# Patient Record
Sex: FEMALE | Race: WHITE | NOT HISPANIC OR LATINO | Employment: FULL TIME | URBAN - METROPOLITAN AREA
[De-identification: names, ages, dates, MRNs, and addresses within clinical notes are randomized per-mention and may not be internally consistent; named-entity substitution may affect disease eponyms.]

---
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
## 2021-11-30 LAB — RAPID GROUP A STREP (OHS): NEGATIVE

## 2021-12-28 LAB
INFLUENZA A ANTIGEN, POC: POSITIVE
INFLUENZA B ANTIGEN, POC: NEGATIVE
RAPID GROUP A STREP (OHS): NEGATIVE
SARS-COV-2 RNA RESP QL NAA+PROBE: NEGATIVE

## 2022-04-11 ENCOUNTER — HISTORICAL (OUTPATIENT)
Dept: ADMINISTRATIVE | Facility: HOSPITAL | Age: 22
End: 2022-04-11

## 2022-04-24 VITALS
SYSTOLIC BLOOD PRESSURE: 123 MMHG | HEIGHT: 64 IN | OXYGEN SATURATION: 96 % | BODY MASS INDEX: 32.44 KG/M2 | WEIGHT: 190 LBS | DIASTOLIC BLOOD PRESSURE: 88 MMHG

## 2022-05-12 ENCOUNTER — OFFICE VISIT (OUTPATIENT)
Dept: URGENT CARE | Facility: CLINIC | Age: 22
End: 2022-05-12
Payer: COMMERCIAL

## 2022-05-12 VITALS
BODY MASS INDEX: 39.78 KG/M2 | HEIGHT: 64 IN | OXYGEN SATURATION: 97 % | WEIGHT: 233 LBS | RESPIRATION RATE: 20 BRPM | TEMPERATURE: 98 F | DIASTOLIC BLOOD PRESSURE: 82 MMHG | SYSTOLIC BLOOD PRESSURE: 134 MMHG | HEART RATE: 80 BPM

## 2022-05-12 DIAGNOSIS — J02.9 SORE THROAT: ICD-10-CM

## 2022-05-12 DIAGNOSIS — R05.9 COUGH: Primary | ICD-10-CM

## 2022-05-12 DIAGNOSIS — J40 BRONCHITIS: ICD-10-CM

## 2022-05-12 LAB
CTP QC/QA: YES
FLUAV AG NPH QL: NEGATIVE
FLUBV AG NPH QL: NEGATIVE
S PYO RRNA THROAT QL PROBE: NEGATIVE
SARS-COV-2 RDRP RESP QL NAA+PROBE: NEGATIVE

## 2022-05-12 PROCEDURE — 99213 OFFICE O/P EST LOW 20 MIN: CPT | Mod: 25,,, | Performed by: NURSE PRACTITIONER

## 2022-05-12 PROCEDURE — 3075F SYST BP GE 130 - 139MM HG: CPT | Mod: CPTII,,, | Performed by: NURSE PRACTITIONER

## 2022-05-12 PROCEDURE — 96372 THER/PROPH/DIAG INJ SC/IM: CPT | Mod: ,,, | Performed by: NURSE PRACTITIONER

## 2022-05-12 PROCEDURE — 87804 INFLUENZA ASSAY W/OPTIC: CPT | Mod: QW,,, | Performed by: NURSE PRACTITIONER

## 2022-05-12 PROCEDURE — 3079F DIAST BP 80-89 MM HG: CPT | Mod: CPTII,,, | Performed by: NURSE PRACTITIONER

## 2022-05-12 PROCEDURE — 1160F RVW MEDS BY RX/DR IN RCRD: CPT | Mod: CPTII,,, | Performed by: NURSE PRACTITIONER

## 2022-05-12 PROCEDURE — U0002: ICD-10-PCS | Mod: QW,,, | Performed by: NURSE PRACTITIONER

## 2022-05-12 PROCEDURE — 87804 POCT INFLUENZA A/B: ICD-10-PCS | Mod: QW,,, | Performed by: NURSE PRACTITIONER

## 2022-05-12 PROCEDURE — 99213 PR OFFICE/OUTPT VISIT, EST, LEVL III, 20-29 MIN: ICD-10-PCS | Mod: 25,,, | Performed by: NURSE PRACTITIONER

## 2022-05-12 PROCEDURE — 1160F PR REVIEW ALL MEDS BY PRESCRIBER/CLIN PHARMACIST DOCUMENTED: ICD-10-PCS | Mod: CPTII,,, | Performed by: NURSE PRACTITIONER

## 2022-05-12 PROCEDURE — 87880 STREP A ASSAY W/OPTIC: CPT | Mod: QW,,, | Performed by: NURSE PRACTITIONER

## 2022-05-12 PROCEDURE — U0002 COVID-19 LAB TEST NON-CDC: HCPCS | Mod: QW,,, | Performed by: NURSE PRACTITIONER

## 2022-05-12 PROCEDURE — 1159F PR MEDICATION LIST DOCUMENTED IN MEDICAL RECORD: ICD-10-PCS | Mod: CPTII,,, | Performed by: NURSE PRACTITIONER

## 2022-05-12 PROCEDURE — 3079F PR MOST RECENT DIASTOLIC BLOOD PRESSURE 80-89 MM HG: ICD-10-PCS | Mod: CPTII,,, | Performed by: NURSE PRACTITIONER

## 2022-05-12 PROCEDURE — 3008F PR BODY MASS INDEX (BMI) DOCUMENTED: ICD-10-PCS | Mod: CPTII,,, | Performed by: NURSE PRACTITIONER

## 2022-05-12 PROCEDURE — 87880 POCT RAPID STREP A: ICD-10-PCS | Mod: QW,,, | Performed by: NURSE PRACTITIONER

## 2022-05-12 PROCEDURE — 3008F BODY MASS INDEX DOCD: CPT | Mod: CPTII,,, | Performed by: NURSE PRACTITIONER

## 2022-05-12 PROCEDURE — 3075F PR MOST RECENT SYSTOLIC BLOOD PRESS GE 130-139MM HG: ICD-10-PCS | Mod: CPTII,,, | Performed by: NURSE PRACTITIONER

## 2022-05-12 PROCEDURE — 96372 PR INJECTION,THERAP/PROPH/DIAG2ST, IM OR SUBCUT: ICD-10-PCS | Mod: ,,, | Performed by: NURSE PRACTITIONER

## 2022-05-12 PROCEDURE — 1159F MED LIST DOCD IN RCRD: CPT | Mod: CPTII,,, | Performed by: NURSE PRACTITIONER

## 2022-05-12 RX ORDER — PROMETHAZINE HYDROCHLORIDE AND DEXTROMETHORPHAN HYDROBROMIDE 6.25; 15 MG/5ML; MG/5ML
SYRUP ORAL
COMMUNITY
Start: 2022-05-05

## 2022-05-12 RX ORDER — AZITHROMYCIN 250 MG/1
TABLET, FILM COATED ORAL
Qty: 6 TABLET | Refills: 0 | Status: SHIPPED | OUTPATIENT
Start: 2022-05-12

## 2022-05-12 RX ORDER — NORGESTIMATE AND ETHINYL ESTRADIOL 0.25-0.035
1 KIT ORAL DAILY
COMMUNITY
Start: 2021-11-19

## 2022-05-12 RX ORDER — SERTRALINE HYDROCHLORIDE 25 MG/1
25 TABLET, FILM COATED ORAL DAILY
COMMUNITY
Start: 2022-02-21

## 2022-05-12 RX ORDER — BETAMETHASONE SODIUM PHOSPHATE AND BETAMETHASONE ACETATE 3; 3 MG/ML; MG/ML
6 INJECTION, SUSPENSION INTRA-ARTICULAR; INTRALESIONAL; INTRAMUSCULAR; SOFT TISSUE
Status: COMPLETED | OUTPATIENT
Start: 2022-05-12 | End: 2022-05-12

## 2022-05-12 RX ORDER — SPIRONOLACTONE 100 MG/1
100 TABLET, FILM COATED ORAL DAILY
COMMUNITY
Start: 2022-04-28

## 2022-05-12 RX ADMIN — BETAMETHASONE SODIUM PHOSPHATE AND BETAMETHASONE ACETATE 6 MG: 3; 3 INJECTION, SUSPENSION INTRA-ARTICULAR; INTRALESIONAL; INTRAMUSCULAR; SOFT TISSUE at 03:05

## 2022-05-12 NOTE — PROGRESS NOTES
"Subjective:       Patient ID: Nohemy Cifuentes is a 21 y.o. female.    Vitals:  height is 5' 4" (1.626 m) and weight is 105.7 kg (233 lb). Her oral temperature is 98.2 °F (36.8 °C). Her blood pressure is 134/82 and her pulse is 80. Her respiration is 20 and oxygen saturation is 97%.     Chief Complaint: Sinus Problem (Started last Thursday )    21-year-old female presents with sinus congestion, cough, yellowish productive sputum.  Was seen 1 week ago in ER similar symptoms given a prescription cough medication not sure of the name, mild shortness of breath worse with exertion.  No fever    Sinus Problem  The current episode started in the past 7 days. The problem has been gradually worsening since onset. There has been no fever. Associated symptoms include congestion, coughing, ear pain, headaches, a hoarse voice, shortness of breath, sinus pressure, sneezing, a sore throat and swollen glands.       HENT: Positive for ear pain, congestion, sinus pressure and sore throat.    Respiratory: Positive for cough and shortness of breath.    Allergic/Immunologic: Positive for sneezing.   Neurological: Positive for headaches.       Objective:      Physical Exam   Constitutional: She is oriented to person, place, and time. She appears well-developed. She is cooperative.  Non-toxic appearance. She does not appear ill. No distress.   HENT:   Head: Normocephalic and atraumatic.   Ears:   Right Ear: Hearing, tympanic membrane, external ear and ear canal normal.   Left Ear: Hearing, tympanic membrane, external ear and ear canal normal.   Nose: Nose normal. No mucosal edema, rhinorrhea or nasal deformity. No epistaxis. Right sinus exhibits no maxillary sinus tenderness and no frontal sinus tenderness. Left sinus exhibits no maxillary sinus tenderness and no frontal sinus tenderness.   Mouth/Throat: Uvula is midline, oropharynx is clear and moist and mucous membranes are normal. No trismus in the jaw. Normal dentition. No uvula " swelling. No oropharyngeal exudate, posterior oropharyngeal edema or posterior oropharyngeal erythema.   Eyes: Conjunctivae and lids are normal. No scleral icterus.   Neck: Trachea normal and phonation normal. Neck supple. No edema present. No erythema present. No neck rigidity present.   Cardiovascular: Normal rate, regular rhythm, normal heart sounds and normal pulses.   Pulmonary/Chest: Effort normal and breath sounds normal. No respiratory distress. She has no decreased breath sounds. She has no rhonchi.   Abdominal: Normal appearance.   Musculoskeletal: Normal range of motion.         General: No deformity. Normal range of motion.   Neurological: She is alert and oriented to person, place, and time. She exhibits normal muscle tone. Coordination normal.   Skin: Skin is warm, dry, intact, not diaphoretic and not pale.   Psychiatric: Her speech is normal and behavior is normal. Judgment and thought content normal.   Nursing note and vitals reviewed.        Assessment:       1. Cough    2. Sore throat    3. Bronchitis          Plan:       -Please take antibiotic if prescribed to completion.  Taking Mucinex OTC for cough as directed.  Increase oral fluids.  Complete antibiotic Z-Mohamud as directed.  Follow-up with the primary care provider if symptoms worsen or persist.  Go to ER for worsening shortness of breath as instructed.    Please follow up with your primary care provider within 2-5 days if your signs and symptoms have not resolved or worsen.     If your condition worsens or fails to improve we recommend that you receive another evaluation at the emergency room immediately or contact your primary medical clinic to discuss your concerns.   You must understand that you have received an Urgent Care treatment only and that you may be released before all of your medical problems are known or treated. You, the patient, will arrange for follow up care as instructed.     Cough  -     POCT COVID-19 Rapid Screening  -      POCT Influenza A/B  -     POCT rapid strep A    Sore throat  -     POCT COVID-19 Rapid Screening  -     POCT Influenza A/B  -     POCT rapid strep A    Bronchitis  -     betamethasone acetate-betamethasone sodium phosphate injection 6 mg  -     azithromycin (Z-LITO) 250 MG tablet; Take 2 tablets by mouth on day 1; Take 1 tablet by mouth on days 2-5  Dispense: 6 tablet; Refill: 0

## 2022-05-12 NOTE — PATIENT INSTRUCTIONS
Take prescription cough medication Mucinex as directed.  Increase oral fluids.  Complete prescription Z-Mohamud as directed.  Follow-up with the primary care provider if symptoms worsen or persist or go to ER for worsening symptoms.

## 2022-07-17 ENCOUNTER — HOSPITAL ENCOUNTER (EMERGENCY)
Facility: HOSPITAL | Age: 22
Discharge: HOME | End: 2022-07-17
Attending: STUDENT IN AN ORGANIZED HEALTH CARE EDUCATION/TRAINING PROGRAM
Payer: COMMERCIAL

## 2022-07-17 VITALS
SYSTOLIC BLOOD PRESSURE: 136 MMHG | HEART RATE: 118 BPM | RESPIRATION RATE: 18 BRPM | BODY MASS INDEX: 41.03 KG/M2 | TEMPERATURE: 101 F | HEIGHT: 64 IN | WEIGHT: 240.31 LBS | DIASTOLIC BLOOD PRESSURE: 80 MMHG

## 2022-07-17 DIAGNOSIS — U07.1 COVID-19: Primary | ICD-10-CM

## 2022-07-17 DIAGNOSIS — R05.9 COUGH: ICD-10-CM

## 2022-07-17 DIAGNOSIS — R50.9 ACUTE FEBRILE ILLNESS: ICD-10-CM

## 2022-07-17 PROCEDURE — 96361 HYDRATE IV INFUSION ADD-ON: CPT

## 2022-07-17 PROCEDURE — 63600175 PHARM REV CODE 636 W HCPCS: Performed by: STUDENT IN AN ORGANIZED HEALTH CARE EDUCATION/TRAINING PROGRAM

## 2022-07-17 PROCEDURE — 99284 EMERGENCY DEPT VISIT MOD MDM: CPT | Mod: 25

## 2022-07-17 PROCEDURE — 96374 THER/PROPH/DIAG INJ IV PUSH: CPT

## 2022-07-17 PROCEDURE — 25000003 PHARM REV CODE 250: Performed by: STUDENT IN AN ORGANIZED HEALTH CARE EDUCATION/TRAINING PROGRAM

## 2022-07-17 RX ORDER — ACETAMINOPHEN 500 MG
1000 TABLET ORAL
Status: DISCONTINUED | OUTPATIENT
Start: 2022-07-17 | End: 2022-07-17

## 2022-07-17 RX ORDER — BENZONATATE 100 MG/1
100 CAPSULE ORAL ONCE
Status: COMPLETED | OUTPATIENT
Start: 2022-07-17 | End: 2022-07-17

## 2022-07-17 RX ORDER — KETOROLAC TROMETHAMINE 30 MG/ML
15 INJECTION, SOLUTION INTRAMUSCULAR; INTRAVENOUS ONCE
Status: COMPLETED | OUTPATIENT
Start: 2022-07-17 | End: 2022-07-17

## 2022-07-17 RX ADMIN — KETOROLAC TROMETHAMINE 15 MG: 30 INJECTION, SOLUTION INTRAMUSCULAR; INTRAVENOUS at 09:07

## 2022-07-17 RX ADMIN — SODIUM CHLORIDE, POTASSIUM CHLORIDE, SODIUM LACTATE AND CALCIUM CHLORIDE 1000 ML: 600; 310; 30; 20 INJECTION, SOLUTION INTRAVENOUS at 08:07

## 2022-07-17 RX ADMIN — BENZONATATE 100 MG: 100 CAPSULE ORAL at 09:07

## 2022-07-17 NOTE — DISCHARGE INSTRUCTIONS
Please follow up with your primary care physician in 2-3 days.      Please continue to isolate at home for the next 5 days. Continue to wash your hands regularly and wear a mask as often as possible.     Please return to the emergency department if you develop any worsening symptoms, fever, chest pain, difficulty breathing, or any other new symptoms or concerns.      Thank you for allowing us to take care of you today.

## 2022-07-17 NOTE — ED PROVIDER NOTES
Encounter Date: 7/17/2022       History     Chief Complaint   Patient presents with    Cough     Covid positive this morning, coughing, chills, and chest discomfort when cough, took tylenol last night     Nohemy Cifuentes is a 21 y.o. female with a past medical history of none who presents to the ED for cough, fever, congestion and body aches x2 days. Patient tested positive for COVID-19 this morning. She reports her cough is nonproductive. She denies chest pain or pleurisy. She denies any difficulty swallowing or speaking, neck pain or stiffness, weakness, numbness or tingling. 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       Chief Complaint: Cough  Location: Generalized  Severity: Moderate  Duration: 2 days  Quality: Nonproductive  Onset: Acute  Alleviated by: Tylenol  Aggravated by: none  Associated Symptoms: fever, body aches  Prior Treatment: Tylenol           Review of patient's allergies indicates:  No Known Allergies  Past Medical History:   Diagnosis Date    Acne     Anxiety     Depression      Past Surgical History:   Procedure Laterality Date    DILATION AND CURETTAGE OF UTERUS       History reviewed. No pertinent family history.  Social History     Tobacco Use    Smoking status: Never Smoker    Smokeless tobacco: Never Used   Substance Use Topics    Alcohol use: Never    Drug use: Never     Review of Systems   Constitutional: Positive for fever. Negative for chills.   HENT: Positive for congestion. Negative for drooling and sore throat.    Eyes: Negative for pain and visual disturbance.   Respiratory: Positive for cough. Negative for chest tightness, shortness of breath and wheezing.    Cardiovascular: Negative for chest pain, palpitations and leg swelling.   Gastrointestinal: Negative for abdominal pain, nausea and vomiting.   Genitourinary: Negative for dysuria and hematuria.   Musculoskeletal: Negative for back pain, neck pain and neck stiffness.   Skin: Negative for pallor and rash.    Neurological: Negative for weakness and numbness.   Hematological: Does not bruise/bleed easily.       Physical Exam     Initial Vitals [07/17/22 0801]   BP Pulse Resp Temp SpO2   136/80 (!) 118 18 (!) 100.9 °F (38.3 °C) --      MAP       --         Physical Exam    Nursing note and vitals reviewed.  Constitutional: She appears well-developed and well-nourished.   HENT:   Head: Normocephalic and atraumatic.   Eyes: EOM are normal. Pupils are equal, round, and reactive to light. Right eye exhibits no discharge. Left eye exhibits no discharge.   Neck: Neck supple.   No neck stiffness   Normal range of motion.  Cardiovascular: Regular rhythm and normal heart sounds.   Pulmonary/Chest: Breath sounds normal. No respiratory distress. She has no wheezes.   Abdominal: Abdomen is soft. Bowel sounds are normal. She exhibits no distension. There is no abdominal tenderness.   Musculoskeletal:         General: No edema. Normal range of motion.      Cervical back: Normal range of motion and neck supple.     Neurological: She is alert and oriented to person, place, and time. She has normal strength. No cranial nerve deficit or sensory deficit. GCS score is 15. GCS eye subscore is 4. GCS verbal subscore is 5. GCS motor subscore is 6.   Skin: Skin is warm and dry. Capillary refill takes less than 2 seconds.         ED Course   Procedures  Labs Reviewed - No data to display       Imaging Results          X-Ray Chest 1 View (Final result)  Result time 07/17/22 09:44:33    Final result by Guy Humphries MD (07/17/22 09:44:33)                 Impression:      No acute chest disease is identified.      Electronically signed by: Guy Humphries  Date:    07/17/2022  Time:    09:44             Narrative:    EXAMINATION:  XR CHEST 1 VIEW    CLINICAL HISTORY:  , Cough, unspecified.    FINDINGS:  No alveolar consolidation, effusion, or pneumothorax is seen.   The thoracic aorta is normal  cardiac silhouette, central pulmonary vessels  and mediastinum are normal in size and are grossly unremarkable.   visualized osseous structures are grossly unremarkable.                              X-Rays:   Independently Interpreted Readings:   Other Readings:  CXR: no consolidation or ptx    Medications   ketorolac injection 15 mg (15 mg Intravenous Given 7/17/22 0900)   lactated ringers bolus 1,000 mL (0 mLs Intravenous Stopped 7/17/22 0955)   benzonatate capsule 100 mg (100 mg Oral Given 7/17/22 0900)     Medical Decision Making:   Initial Assessment:   21F presents with cough, fever and body aches.  Differential Diagnosis:   COVID-19, Viral URI, Influenza  Independently Interpreted Test(s):   I have ordered and independently interpreted X-rays - see prior notes.  Clinical Tests:   Lab Tests: Reviewed and Ordered  Radiological Study: Ordered and Reviewed  ED Management:  MDM: Nohemy Cifuentes is a 21 y.o. female with above past medical history who presents to the ED for COVID-19. Vital signs reviewed. CXR pending. Medications ordered for symptom control. Pulse oximetry monitoring ordered. Patient agrees with plan of care. All questions answered.    Update: CXR negative. Symptoms improving in the emergency department. Patient is maintaining normal O2 sats on room air at rest and with ambulation without evidence of respiratory distress. Strict return precautions and COVID-19 precautions and CDC recommendations discussed and patient has verbalized understanding. Patient will follow up with her PCP.    Dispo: Discharge    Data Reviewed/Counseling: I have personally reviewed the patient's vital signs, nursing notes, and other relevant tests, information, and imaging. I had a detailed discussion regarding the historical points, exam findings, and any diagnostic results supporting the discharge diagnosis.     Portions of this note were dictated using voice recognition software. Although it was reviewed for accuracy, some inherent voice recognition errors may  have occurred and be present in this document.               ED Course as of 07/17/22 1343   Sun Jul 17, 2022   0936 Patient is maintaining normal oxygen saturations on room air without evidence of respiratory distress. [MC]      ED Course User Index  [MC] Antoine Romero MD             Clinical Impression:   Final diagnoses:  [R05.9] Cough  [U07.1] COVID-19 (Primary)          ED Disposition Condition    Discharge Stable        ED Prescriptions     None        Follow-up Information     Follow up With Specialties Details Why Contact Info    Your PCP  In 2 days      [facility] - Emergency Dept Emergency Medicine  If symptoms worsen [address] [address]  [phone]           Antoine Romero MD  07/17/22 4413

## 2022-09-21 ENCOUNTER — HISTORICAL (OUTPATIENT)
Dept: ADMINISTRATIVE | Facility: HOSPITAL | Age: 22
End: 2022-09-21
Payer: COMMERCIAL

## 2023-09-18 ENCOUNTER — HOSPITAL ENCOUNTER (EMERGENCY)
Facility: HOSPITAL | Age: 23
Discharge: HOME | End: 2023-09-18
Attending: EMERGENCY MEDICINE
Payer: COMMERCIAL

## 2023-09-18 VITALS
TEMPERATURE: 99 F | HEART RATE: 112 BPM | RESPIRATION RATE: 24 BRPM | WEIGHT: 246.5 LBS | BODY MASS INDEX: 42.08 KG/M2 | OXYGEN SATURATION: 99 % | SYSTOLIC BLOOD PRESSURE: 125 MMHG | DIASTOLIC BLOOD PRESSURE: 93 MMHG | HEIGHT: 64 IN

## 2023-09-18 DIAGNOSIS — R07.9 CHEST PAIN: Primary | ICD-10-CM

## 2023-09-18 DIAGNOSIS — R79.89 ELEVATED TROPONIN: ICD-10-CM

## 2023-09-18 LAB
ALBUMIN SERPL-MCNC: 4.1 G/DL (ref 3.5–5)
ALBUMIN/GLOB SERPL: 1 RATIO (ref 1.1–2)
ALP SERPL-CCNC: 82 UNIT/L (ref 40–150)
ALT SERPL-CCNC: 17 UNIT/L (ref 0–55)
AST SERPL-CCNC: 15 UNIT/L (ref 5–34)
B-HCG UR QL: NEGATIVE
BASOPHILS # BLD AUTO: 0.02 X10(3)/MCL
BASOPHILS NFR BLD AUTO: 0.2 %
BILIRUB SERPL-MCNC: 0.3 MG/DL
BNP BLD-MCNC: 16.5 PG/ML
BUN SERPL-MCNC: 9 MG/DL (ref 7–18.7)
CALCIUM SERPL-MCNC: 10.6 MG/DL (ref 8.4–10.2)
CHLORIDE SERPL-SCNC: 103 MMOL/L (ref 98–107)
CK MB SERPL-MCNC: <1 NG/ML
CK SERPL-CCNC: 109 U/L (ref 29–168)
CO2 SERPL-SCNC: 27 MMOL/L (ref 22–29)
CREAT SERPL-MCNC: 0.8 MG/DL (ref 0.55–1.02)
CTP QC/QA: YES
D DIMER PPP IA.FEU-MCNC: <0.27 UG/ML FEU (ref 0–0.5)
EOSINOPHIL # BLD AUTO: 0.08 X10(3)/MCL (ref 0–0.9)
EOSINOPHIL NFR BLD AUTO: 0.6 %
ERYTHROCYTE [DISTWIDTH] IN BLOOD BY AUTOMATED COUNT: 13 % (ref 11.5–17)
GFR SERPLBLD CREATININE-BSD FMLA CKD-EPI: >60 MLS/MIN/1.73/M2
GLOBULIN SER-MCNC: 4.3 GM/DL (ref 2.4–3.5)
GLUCOSE SERPL-MCNC: 91 MG/DL (ref 74–100)
HCT VFR BLD AUTO: 46.2 % (ref 37–47)
HGB BLD-MCNC: 14.7 G/DL (ref 12–16)
IMM GRANULOCYTES # BLD AUTO: 0.03 X10(3)/MCL (ref 0–0.04)
IMM GRANULOCYTES NFR BLD AUTO: 0.2 %
LYMPHOCYTES # BLD AUTO: 3.67 X10(3)/MCL (ref 0.6–4.6)
LYMPHOCYTES NFR BLD AUTO: 29.1 %
MCH RBC QN AUTO: 25.2 PG (ref 27–31)
MCHC RBC AUTO-ENTMCNC: 31.8 G/DL (ref 33–36)
MCV RBC AUTO: 79.1 FL (ref 80–94)
MONOCYTES # BLD AUTO: 0.69 X10(3)/MCL (ref 0.1–1.3)
MONOCYTES NFR BLD AUTO: 5.5 %
NEUTROPHILS # BLD AUTO: 8.13 X10(3)/MCL (ref 2.1–9.2)
NEUTROPHILS NFR BLD AUTO: 64.4 %
NRBC BLD AUTO-RTO: 0 %
PLATELET # BLD AUTO: 352 X10(3)/MCL (ref 130–400)
PMV BLD AUTO: 9.5 FL (ref 7.4–10.4)
POTASSIUM SERPL-SCNC: 3.5 MMOL/L (ref 3.5–5.1)
PROT SERPL-MCNC: 8.4 GM/DL (ref 6.4–8.3)
RBC # BLD AUTO: 5.84 X10(6)/MCL (ref 4.2–5.4)
SODIUM SERPL-SCNC: 139 MMOL/L (ref 136–145)
TROPONIN I SERPL-MCNC: 0.05 NG/ML (ref 0–0.04)
TROPONIN I SERPL-MCNC: <0.01 NG/ML (ref 0–0.04)
WBC # SPEC AUTO: 12.62 X10(3)/MCL (ref 4.5–11.5)

## 2023-09-18 PROCEDURE — 93005 ELECTROCARDIOGRAM TRACING: CPT

## 2023-09-18 PROCEDURE — 84484 ASSAY OF TROPONIN QUANT: CPT | Performed by: EMERGENCY MEDICINE

## 2023-09-18 PROCEDURE — 85379 FIBRIN DEGRADATION QUANT: CPT | Performed by: EMERGENCY MEDICINE

## 2023-09-18 PROCEDURE — 84484 ASSAY OF TROPONIN QUANT: CPT | Performed by: NURSE PRACTITIONER

## 2023-09-18 PROCEDURE — 81025 URINE PREGNANCY TEST: CPT | Performed by: NURSE PRACTITIONER

## 2023-09-18 PROCEDURE — 85025 COMPLETE CBC W/AUTO DIFF WBC: CPT | Performed by: NURSE PRACTITIONER

## 2023-09-18 PROCEDURE — 82553 CREATINE MB FRACTION: CPT | Performed by: NURSE PRACTITIONER

## 2023-09-18 PROCEDURE — 80053 COMPREHEN METABOLIC PANEL: CPT | Performed by: NURSE PRACTITIONER

## 2023-09-18 PROCEDURE — 99285 EMERGENCY DEPT VISIT HI MDM: CPT | Mod: 25

## 2023-09-18 PROCEDURE — 83880 ASSAY OF NATRIURETIC PEPTIDE: CPT | Performed by: EMERGENCY MEDICINE

## 2023-09-18 PROCEDURE — 82550 ASSAY OF CK (CPK): CPT | Performed by: NURSE PRACTITIONER

## 2023-09-18 NOTE — ED PROVIDER NOTES
Encounter Date: 9/18/2023       History     Chief Complaint   Patient presents with    Pleurisy     Pt c/o chest pressure that worsens with deep breath, she reports this has started last week around Thursday and was feeling this intermittently but has been constant starting today. Pt was seen by her PCP this morning, EKG done, pt's bp meds changed to amlodipne and was given a script for lasix to take for three days. Pt hasn't started those meds, Pt concerned bc pressure not resolving.      She is here with her mother for concerns about blood pressure and chest symptoms.  Underlying history anxiety, depression, hypertension, she has an indwelling birth control implant, recently changed from a diuretic to begin amlodipine, also short-term Lasix for some lower extremity swelling.  Her mother got concerned that her heart rate and blood pressure were both elevated, her heart rate was a little irregular, interfacing flush.  She is been having some chest pressure or tightness for a few days, somewhat worse with deep breath, somewhat associated with a sense of dyspnea and the symptoms seemed more constant today.  She denies any personal or family history of venous thromboembolic disease and has not had any recent injury or travel.  Mild lower extremity edema is symmetric.  No hemoptysis but she does describe some anterior chest discomfort with deep breath.  No other complaints.  No fever, chills or sputum production.    The history is provided by the patient and a parent. No  was used.     Review of patient's allergies indicates:  No Known Allergies  Past Medical History:   Diagnosis Date    Acne     Anxiety     Depression      Past Surgical History:   Procedure Laterality Date    DILATION AND CURETTAGE OF UTERUS       History reviewed. No pertinent family history.  Social History     Tobacco Use    Smoking status: Never    Smokeless tobacco: Never   Substance Use Topics    Alcohol use: Never    Drug  use: Never     Review of Systems   Constitutional:  Negative for activity change, fatigue and fever.   HENT:  Negative for congestion, ear pain, facial swelling, nosebleeds, sinus pressure and sore throat.    Eyes:  Negative for pain, discharge, redness and visual disturbance.   Respiratory:  Positive for chest tightness and shortness of breath. Negative for cough, choking and wheezing.    Cardiovascular:  Positive for leg swelling. Negative for chest pain and palpitations.   Gastrointestinal:  Negative for abdominal distention, abdominal pain, nausea and vomiting.   Endocrine: Negative for heat intolerance, polydipsia and polyuria.   Genitourinary:  Negative for difficulty urinating, dysuria, flank pain, hematuria and urgency.   Musculoskeletal:  Negative for back pain, gait problem, joint swelling and myalgias.   Skin:  Negative for color change and rash.   Allergic/Immunologic: Negative for environmental allergies and food allergies.   Neurological:  Negative for dizziness, weakness, numbness and headaches.   Hematological:  Negative for adenopathy. Does not bruise/bleed easily.   Psychiatric/Behavioral:  Negative for agitation and behavioral problems. The patient is not nervous/anxious.    All other systems reviewed and are negative.      Physical Exam     Initial Vitals [09/18/23 1645]   BP Pulse Resp Temp SpO2   (!) 148/103 104 20 98.5 °F (36.9 °C) 100 %      MAP       --         Physical Exam    Nursing note and vitals reviewed.  Constitutional: She appears well-developed and well-nourished. She is not diaphoretic. No distress.   Mildly hypertensive/ mildly overweight   HENT:   Head: Normocephalic and atraumatic.   Mouth/Throat: No oropharyngeal exudate.   Eyes: Conjunctivae and EOM are normal. Pupils are equal, round, and reactive to light. Right eye exhibits no discharge. Left eye exhibits no discharge. No scleral icterus.   Neck: Neck supple. No thyromegaly present. No tracheal deviation present. No JVD  present.   Normal range of motion.  Cardiovascular:  Regular rhythm, normal heart sounds and intact distal pulses.     Exam reveals no gallop and no friction rub.       No murmur heard.  Mild tachycardia, mild sinus arrhythmia by auscultation   Pulmonary/Chest: Breath sounds normal. No stridor. No respiratory distress. She has no wheezes. She has no rhonchi. She has no rales. She exhibits no tenderness.   Abdominal: Abdomen is soft. Bowel sounds are normal. She exhibits no distension and no mass. There is no abdominal tenderness. There is no rebound and no guarding.   Musculoskeletal:         General: No tenderness or edema. Normal range of motion.      Cervical back: Normal range of motion and neck supple.     Neurological: She is alert and oriented to person, place, and time. She has normal strength.   Skin: Skin is warm and dry. No rash and no abscess noted. No erythema.   Psychiatric: She has a normal mood and affect. Her behavior is normal. Judgment and thought content normal.         ED Course   Procedures  Labs Reviewed   COMPREHENSIVE METABOLIC PANEL - Abnormal; Notable for the following components:       Result Value    Calcium Level Total 10.6 (*)     Protein Total 8.4 (*)     Globulin 4.3 (*)     Albumin/Globulin Ratio 1.0 (*)     All other components within normal limits   TROPONIN I - Abnormal; Notable for the following components:    Troponin-I 0.054 (*)     All other components within normal limits   CBC WITH DIFFERENTIAL - Abnormal; Notable for the following components:    WBC 12.62 (*)     RBC 5.84 (*)     MCV 79.1 (*)     MCH 25.2 (*)     MCHC 31.8 (*)     All other components within normal limits   CK - Normal   CK-MB - Normal   D DIMER, QUANTITATIVE - Normal   B-TYPE NATRIURETIC PEPTIDE - Normal   TROPONIN I - Normal   CBC W/ AUTO DIFFERENTIAL    Narrative:     The following orders were created for panel order CBC auto differential.  Procedure                               Abnormality          Status                     ---------                               -----------         ------                     CBC with Differential[[id number]]       Abnormal            Final result                 Please view results for these tests on the individual orders.   EXTRA TUBES    Narrative:     The following orders were created for panel order EXTRA TUBES.  Procedure                               Abnormality         Status                     ---------                               -----------         ------                     Light Blue Top Hold[[id number]]                             In process                   Please view results for these tests on the individual orders.   LIGHT BLUE TOP HOLD   PREGNANCY TEST, URINE RAPID   POCT URINE PREGNANCY     EKG Readings: (Independently Interpreted)   Initial Reading: No STEMI. Rhythm: Sinus Tachycardia. Heart Rate: 109.   Sinus tachycardia 109 beats per minute, nonspecific T-wave abnormality, no acute diagnostic change in no previous to compare with.     ECG Results              EKG 12-lead (Final result)  Result time 09/18/23 20:42:19      Final result by Interface, Lab In [facility] (09/18/23 20:42:19)                   Narrative:    Test Reason : R07.9,    Vent. Rate : 109 BPM     Atrial Rate : 109 BPM     P-R Int : 136 ms          QRS Dur : 076 ms      QT Int : 336 ms       P-R-T Axes : 031 041 009 degrees     QTc Int : 452 ms    Sinus tachycardia  Nonspecific T wave abnormality  Abnormal ECG  No previous ECGs available  Confirmed by Dany Zambrano MD ([id number]) on 9/18/2023 8:42:08 PM    Referred By: [email or web address]   SELF           Confirmed By:Dany Zambrano MD                                  Imaging Results              X-Ray Chest 1 View (Final result)  Result time 09/18/23 17:31:30      Final result by Andrea Dave MD (09/18/23 17:31:30)                   Impression:      NO ACUTE CARDIOPULMONARY PROCESS IDENTIFIED.      Electronically signed by: Andrea  Dave  Date:    09/18/2023  Time:    17:31               Narrative:    EXAMINATION:  XR CHEST 1 VIEW    CLINICAL HISTORY:  Chest pain, unspecified    TECHNIQUE:  One view    COMPARISON:  July 17, 2022.    FINDINGS:  Cardiopericardial silhouette is within normal limits.  Lungs hypoventilatory changes without dense focal or segmental consolidation, congestive process, pleural effusions or pneumothorax.  Suspect mild scoliosis which may be positional.                                    5:48 PM Very slightly elevated troponin noted.    8:50 PM Sinus rhythm noted to range between upper 90s to low 100s and as high as 130s with stimulation.  Blood pressure gradually dropped from elevated to normal to low normal, elevated again when stimulated.  Repeat troponin undetectable.  Discussed with patient and mother in detail.  Recommend sodium restriction, weight loss, exercise, stress reduction, and routine primary care follow-up.  Is not clear that she has a true underlying diagnosis of hypertension nor that she needs blood pressure medicine.  No sign of emergency condition.  Stable for discharge and home management.       Medications - No data to display  Medical Decision Making  Problems Addressed:  Chest pain: acute illness or injury  Elevated troponin: acute illness or injury    Amount and/or Complexity of Data Reviewed  Labs: ordered. Decision-making details documented in ED Course.  ECG/medicine tests: ordered and independent interpretation performed. Decision-making details documented in ED Course.    Risk  Prescription drug management.      Additional MDM:   Differential Diagnosis:   Anxiety, hypertension, pleurisy, atypical chest pain, pulmonary embolism, other heart disease                             Clinical Impression:   Final diagnoses:  [R07.9] Chest pain (Primary)  [R77.8] Elevated troponin        ED Disposition Condition    Discharge Stable          ED Prescriptions    None       Follow-up Information        Follow up With Specialties Details Why Contact Info    [facility] - Emergency Dept Emergency Medicine  As needed [address] [address] [phone]             Dalton Okeefe MD  09/18/23 2054

## 2023-09-19 NOTE — DISCHARGE INSTRUCTIONS
As discussed, no sign of a significant problem.      Exercise, sodium restriction, weight loss, occasional self-monitoring of blood pressure are recommended and follow-up with primary care.

## 2025-04-13 ENCOUNTER — OFFICE VISIT (OUTPATIENT)
Dept: URGENT CARE | Facility: CLINIC | Age: 25
End: 2025-04-13
Payer: COMMERCIAL

## 2025-04-13 VITALS
TEMPERATURE: 99 F | OXYGEN SATURATION: 99 % | SYSTOLIC BLOOD PRESSURE: 135 MMHG | HEART RATE: 89 BPM | RESPIRATION RATE: 16 BRPM | DIASTOLIC BLOOD PRESSURE: 85 MMHG | HEIGHT: 64 IN | BODY MASS INDEX: 40.8 KG/M2 | WEIGHT: 239 LBS

## 2025-04-13 DIAGNOSIS — R05.9 COUGH, UNSPECIFIED TYPE: ICD-10-CM

## 2025-04-13 DIAGNOSIS — J01.90 ACUTE NON-RECURRENT SINUSITIS, UNSPECIFIED LOCATION: Primary | ICD-10-CM

## 2025-04-13 LAB
CTP QC/QA: YES
CTP QC/QA: YES
POC MOLECULAR INFLUENZA A AGN: NEGATIVE
POC MOLECULAR INFLUENZA B AGN: NEGATIVE
SARS CORONAVIRUS 2 ANTIGEN: NEGATIVE

## 2025-04-13 PROCEDURE — 87502 INFLUENZA DNA AMP PROBE: CPT | Mod: QW,,,

## 2025-04-13 PROCEDURE — 96372 THER/PROPH/DIAG INJ SC/IM: CPT | Mod: ,,,

## 2025-04-13 PROCEDURE — 87811 SARS-COV-2 COVID19 W/OPTIC: CPT | Mod: QW,,,

## 2025-04-13 PROCEDURE — 99213 OFFICE O/P EST LOW 20 MIN: CPT | Mod: 25,,,

## 2025-04-13 RX ORDER — LEVOTHYROXINE SODIUM 25 UG/1
25 TABLET ORAL
COMMUNITY
Start: 2025-04-08

## 2025-04-13 RX ORDER — PREDNISONE 20 MG/1
20 TABLET ORAL DAILY
Qty: 5 TABLET | Refills: 0 | Status: SHIPPED | OUTPATIENT
Start: 2025-04-13 | End: 2025-04-18

## 2025-04-13 RX ORDER — DEXAMETHASONE SODIUM PHOSPHATE 10 MG/ML
10 INJECTION INTRAMUSCULAR; INTRAVENOUS
Status: COMPLETED | OUTPATIENT
Start: 2025-04-13 | End: 2025-04-13

## 2025-04-13 RX ADMIN — DEXAMETHASONE SODIUM PHOSPHATE 10 MG: 10 INJECTION INTRAMUSCULAR; INTRAVENOUS at 10:04

## 2025-04-13 NOTE — LETTER
April 13, 2025      [facility]  [address]  [place] [address]  Phone: [phone]  Fax: [phone]       Patient: Nohemy Cifuentes   YOB: 2000  Date of Visit: 04/13/2025    To Whom It May Concern:    Dannielle Cifuentes  was at [facility] on 04/13/2025. The patient may return to work/school on 04/14/2025. with no restrictions. If you have any questions or concerns, or if I can be of further assistance, please do not hesitate to contact me.    Sincerely,    Emilie Rincon CMA     
No

## 2025-04-13 NOTE — PROGRESS NOTES
"Subjective:      Patient ID: Nohemy Cifuentes is a 24 y.o. female.    Vitals:  height is 5' 4" (1.626 m) and weight is 108.4 kg (239 lb). Her oral temperature is 98.7 °F (37.1 °C). Her blood pressure is 135/85 and her pulse is 89. Her respiration is 16 and oxygen saturation is 99%.     Chief Complaint: No chief complaint on file.     Patient is a 24 y.o. female who presents to urgent care with complaints of nasal congestion, sore throat, postnasal drip, sinus pressure, bilateral ear pressure as well as cough x 2-3 days.  She does report development of headaches almost a week ago however other symptoms did not start until 2-3 days ago..  Alleviating factors include Dayquil and Nyquil with no relief. Patient denies fever, body aches, chills, neck stiffness, rash, GI symptoms.    ROS   Objective:     Physical Exam   Constitutional: She is oriented to person, place, and time. She appears well-developed. She is cooperative.  Non-toxic appearance. She does not appear ill. No distress.   HENT:   Head: Normocephalic and atraumatic.   Ears:   Right Ear: Hearing, tympanic membrane, external ear and ear canal normal.   Left Ear: Hearing, tympanic membrane, external ear and ear canal normal.      Comments: Bilateral TM: Clear effusion  Nose: Rhinorrhea and congestion present. No mucosal edema or nasal deformity. No epistaxis. Right sinus exhibits maxillary sinus tenderness and frontal sinus tenderness. Left sinus exhibits maxillary sinus tenderness and frontal sinus tenderness.   Mouth/Throat: Uvula is midline, oropharynx is clear and moist and mucous membranes are normal. Mucous membranes are moist. No trismus in the jaw. Normal dentition. No uvula swelling. No oropharyngeal exudate, posterior oropharyngeal edema or posterior oropharyngeal erythema.      Comments: Clear postnasal drip  Eyes: Conjunctivae and lids are normal. No scleral icterus.   Neck: Trachea normal and phonation normal. Neck supple. No edema present. No " erythema present. No neck rigidity present.   Cardiovascular: Normal rate, regular rhythm, normal heart sounds and normal pulses.   Pulmonary/Chest: Effort normal and breath sounds normal. No respiratory distress. She has no decreased breath sounds. She has no wheezes. She has no rhonchi. She has no rales.   Abdominal: Normal appearance.   Musculoskeletal: Normal range of motion.         General: No deformity. Normal range of motion.   Lymphadenopathy:     She has no cervical adenopathy.   Neurological: She is alert and oriented to person, place, and time. She exhibits normal muscle tone. Coordination normal.   Skin: Skin is warm, dry, intact, not diaphoretic and not pale.   Psychiatric: Her speech is normal and behavior is normal. Judgment and thought content normal.   Nursing note and vitals reviewed.      Assessment:     1. Acute non-recurrent sinusitis, unspecified location    2. Cough, unspecified type        Plan:       Acute non-recurrent sinusitis, unspecified location  -     POCT Influenza A/B Molecular  -     SARS Coronavirus 2 Antigen, POCT Manual Read  -     predniSONE (DELTASONE) 20 MG tablet; Take 1 tablet (20 mg total) by mouth once daily. for 5 days  Dispense: 5 tablet; Refill: 0  -     dexAMETHasone injection 10 mg  -     pyrilamine-chlophedianoL 12.5-12.5 mg/5 mL Liqd; Take 10 mLs by mouth every 8 (eight) hours as needed.  Dispense: 180 mL; Refill: 0    Cough, unspecified type    May begin oral steroids tomorrow.  Negative flu, COVID swab, discussed likely other viral etiologies.  As discussed continue to monitor symptoms if you develop worsening of symptoms, fever after 6 days of being sick, persistent one-sided sinus pressure that time may call back for possible antibiotic therapy.  Drink plenty of fluids. Get plenty of rest.   Claritin, Zyrtec or other over-the-counter antihistamine for runny nose, postnasal drip, nasal congestion.  Nasal spray such as Nasacort or Flonase for  congestion.  Prescription cough syrup has antihistamine present, do not combine with other antihistamines or cough medications.  Over-the-counter decongestants such as Sudafed, phenylephrine or pseudoephedrine.  Avoid these if you have a history of high blood pressure.  Warm saltwater gargles for sore throat.  Warm water with honey to help coat the throat.  Throat lozenges.  Chloraseptic spray for worsening sore throat.  Tylenol or ibuprofen as needed for sore throat and fever.  May alternate every 3 hours.    Call or return to clinic as needed   Go to the ER with any significant change or worsening of symptoms.   Follow up with your primary care doctor.

## 2025-04-13 NOTE — PATIENT INSTRUCTIONS
Excuse for tomorrow if needed  May begin oral steroids tomorrow morning  Negative flu, COVID swab, discussed likely other viral etiologies.  As discussed continue to monitor symptoms if you develop worsening of symptoms, fever after 6 days of being sick, persistent one-sided sinus pressure that time may call back for possible antibiotic therapy.  Drink plenty of fluids. Get plenty of rest.   Claritin, Zyrtec or other over-the-counter antihistamine for runny nose, postnasal drip, nasal congestion.  Nasal spray such as Nasacort or Flonase for congestion.  Prescription cough syrup has antihistamine present, do not combine with other antihistamines or cough medications.  Over-the-counter decongestants such as Sudafed, phenylephrine or pseudoephedrine.  Avoid these if you have a history of high blood pressure.  Warm saltwater gargles for sore throat.  Warm water with honey to help coat the throat.  Throat lozenges.  Chloraseptic spray for worsening sore throat.  Tylenol or ibuprofen as needed for sore throat and fever.  May alternate every 3 hours.    Call or return to clinic as needed   Go to the ER with any significant change or worsening of symptoms.   Follow up with your primary care doctor.